# Patient Record
Sex: MALE | Race: WHITE | NOT HISPANIC OR LATINO | Employment: STUDENT | URBAN - METROPOLITAN AREA
[De-identification: names, ages, dates, MRNs, and addresses within clinical notes are randomized per-mention and may not be internally consistent; named-entity substitution may affect disease eponyms.]

---
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
## 2024-12-26 ENCOUNTER — OFFICE VISIT (OUTPATIENT)
Dept: FAMILY MEDICINE | Facility: CLINIC | Age: 19
End: 2024-12-26
Payer: COMMERCIAL

## 2024-12-26 ENCOUNTER — HOSPITAL ENCOUNTER (OUTPATIENT)
Dept: RADIOLOGY | Facility: HOSPITAL | Age: 19
Discharge: HOME | End: 2024-12-26
Attending: STUDENT IN AN ORGANIZED HEALTH CARE EDUCATION/TRAINING PROGRAM
Payer: COMMERCIAL

## 2024-12-26 VITALS
OXYGEN SATURATION: 98 % | WEIGHT: 265 LBS | HEIGHT: 69 IN | BODY MASS INDEX: 39.25 KG/M2 | SYSTOLIC BLOOD PRESSURE: 120 MMHG | TEMPERATURE: 99 F | HEART RATE: 75 BPM | DIASTOLIC BLOOD PRESSURE: 66 MMHG

## 2024-12-26 DIAGNOSIS — M25.511 ACUTE PAIN OF RIGHT SHOULDER: ICD-10-CM

## 2024-12-26 DIAGNOSIS — E66.812 CLASS 2 OBESITY WITH BODY MASS INDEX (BMI) OF 39.0 TO 39.9 IN ADULT, UNSPECIFIED OBESITY TYPE, UNSPECIFIED WHETHER SERIOUS COMORBIDITY PRESENT: ICD-10-CM

## 2024-12-26 DIAGNOSIS — Z72.0 CURRENT EVERY DAY NICOTINE VAPING: ICD-10-CM

## 2024-12-26 DIAGNOSIS — Z76.89 ESTABLISHING CARE WITH NEW DOCTOR, ENCOUNTER FOR: Primary | ICD-10-CM

## 2024-12-26 PROBLEM — L60.0 INGROWN NAIL: Status: RESOLVED | Noted: 2020-06-30 | Resolved: 2024-12-26

## 2024-12-26 PROBLEM — L03.031 CELLULITIS OF GREAT TOE OF RIGHT FOOT: Status: RESOLVED | Noted: 2020-06-30 | Resolved: 2024-12-26

## 2024-12-26 PROCEDURE — 73030 X-RAY EXAM OF SHOULDER: CPT | Mod: 26,RT,, | Performed by: RADIOLOGY

## 2024-12-26 PROCEDURE — 99999 PR PBB SHADOW E&M-NEW PATIENT-LVL IV: CPT | Mod: PBBFAC,,, | Performed by: STUDENT IN AN ORGANIZED HEALTH CARE EDUCATION/TRAINING PROGRAM

## 2024-12-26 PROCEDURE — 73030 X-RAY EXAM OF SHOULDER: CPT | Mod: TC,PO,RT

## 2024-12-26 RX ORDER — MELOXICAM 15 MG/1
15 TABLET ORAL DAILY
Qty: 30 TABLET | Refills: 0 | Status: SHIPPED | OUTPATIENT
Start: 2024-12-26 | End: 2025-01-25

## 2024-12-26 NOTE — ASSESSMENT & PLAN NOTE
Chronic problem.  Counseled on health risks related to tobacco use.  Discussed smoking cessation including Rx and non-Rx pharmacologic options and non pharmacologic options. A total of 4 minutes was spent on tobacco screening and counseling.

## 2024-12-26 NOTE — ASSESSMENT & PLAN NOTE
Chronic problem. Not well controlled based on recorded BMI. Discussed importance of lifestyle modifications including diet and exercise.

## 2024-12-26 NOTE — PROGRESS NOTES
Subjective      Agustín Shields is a 19 y.o. male        Chief Complaint   Patient presents with    Establish Care    right shoulder pain        HPI     Patient presents today to establish care.     Patient also here for concerns of right shoulder pain.  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  Patient fell from the 2nd level of the ship but was able to brace himself catching the handrail.  Following this incident, patient has reported pain as well as decreased range of motion.  He reports the pain is worsened with exertion and rated as a 7/10.  He has been taking Tylenol and ibuprofen.      Active Problem List with Overview Notes    Diagnosis Date Noted    Class 2 obesity with body mass index (BMI) of 39.0 to 39.9 in adult 12/26/2024     Patient with history of elevated BMI. Last recorded BMI listed below.  He reports periodic exercise without any specific diet plan.       Body mass index is 39.13 kg/m².        Current every day nicotine vaping 12/26/2024     Patient with history of tobacco abuse. He reports daily nicotine vape use without any thoughts of quitting.                  ALLERGIES  Patient has no known allergies.     MEDICATIONS  Outpatient Encounter Medications as of 12/26/2024   Medication Sig Dispense Refill    ASCORBATE CALCIUM (VITAMIN C ORAL) Take 1 tablet by mouth as needed.      ibuprofen (ADVIL,MOTRIN) 100 mg/5 mL suspension Take by mouth every 6 (six) hours as needed.      meloxicam (MOBIC) 15 MG tablet Take 1 tablet (15 mg total) by mouth once daily. 30 tablet 0     No facility-administered encounter medications on file as of 12/26/2024.        PAST MEDICAL HISTORY  Past Medical History:   Diagnosis Date    Kawasaki disease         PAST SURGIAL HISTORY  History reviewed. No pertinent surgical history.     FAMILY HISTORY  Family History   Problem Relation Name Age of Onset    Hypertension Mother      Diabetes Mother      Hypertension  Father      Restless legs syndrome Father      Sleep apnea Father      No Known Problems Maternal Grandmother      No Known Problems Maternal Grandfather      No Known Problems Paternal Grandfather      Amblyopia Neg Hx      Blindness Neg Hx      Cancer Neg Hx      Cataracts Neg Hx      Glaucoma Neg Hx      Macular degeneration Neg Hx      Retinal detachment Neg Hx      Strabismus Neg Hx      Stroke Neg Hx      Thyroid disease Neg Hx          PAST SOCIAL HISTORY  Social History     Socioeconomic History    Marital status: Single   Tobacco Use    Smoking status: Never    Smokeless tobacco: Current   Substance and Sexual Activity    Alcohol use: Yes     Comment: occasionally    Drug use: Never    Sexual activity: Yes     Partners: Female   Social History Narrative    8 year old male in the 5 grade        Health Maintenance   Topic Date Due    Hepatitis C Screening  Never done    HIV Screening  Never done    TETANUS VACCINE  04/14/2024    Influenza Vaccine (1) 09/01/2024    COVID-19 Vaccine (1 - 2024-25 season) Never done    RSV Vaccine (Age 60+ and Pregnant patients) (1 - 1-dose 75+ series) 03/22/2078    Lipid Panel  Completed    HPV Vaccines  Completed    Pneumococcal Vaccines (Age 0-49)  Aged Out           ROS        Objective   Vitals:    12/26/24 1306   BP: 120/66   Pulse: 75   Temp: 99.1 °F (37.3 °C)     Body mass index is 39.13 kg/m².       Physical Exam  Constitutional:       General: He is not in acute distress.     Appearance: He is obese. He is not ill-appearing.   HENT:      Head: Normocephalic and atraumatic.   Eyes:      Extraocular Movements: Extraocular movements intact.      Pupils: Pupils are equal, round, and reactive to light.   Cardiovascular:      Rate and Rhythm: Normal rate and regular rhythm.   Pulmonary:      Effort: Pulmonary effort is normal.      Breath sounds: Normal breath sounds.   Abdominal:      Palpations: Abdomen is soft.   Musculoskeletal:      Right shoulder: Tenderness present.  Decreased range of motion.      Cervical back: Normal range of motion.   Skin:     General: Skin is warm and dry.   Neurological:      General: No focal deficit present.      Mental Status: He is alert and oriented to person, place, and time. Mental status is at baseline.   Psychiatric:         Mood and Affect: Mood normal.         Thought Content: Thought content normal.          Establishing care with new doctor, encounter for    Class 2 obesity with body mass index (BMI) of 39.0 to 39.9 in adult, unspecified obesity type, unspecified whether serious comorbidity present  Assessment & Plan:  Chronic problem. Not well controlled based on recorded BMI. Discussed importance of lifestyle modifications including diet and exercise.         Current every day nicotine vaping  Assessment & Plan:  Chronic problem.  Counseled on health risks related to tobacco use.  Discussed smoking cessation including Rx and non-Rx pharmacologic options and non pharmacologic options. A total of 4 minutes was spent on tobacco screening and counseling.         Acute pain of right shoulder  Comments:  Acute problem.  Not well controlled per patient.  Will assess with x-ray.  Discussed pain control with Tylenol up to 3g/day. Start Mobic.  Orders:  -     meloxicam (MOBIC) 15 MG tablet; Take 1 tablet (15 mg total) by mouth once daily.  Dispense: 30 tablet; Refill: 0  -     X-ray Shoulder 2 or More Views Right; Future; Expected date: 12/26/2024              Maurisio Leonard             Portions of this note were created using voice recognition software. There may be voice recognition errors found in the text, and attempts were made to correct these errors prior to signature.

## 2025-01-16 ENCOUNTER — HOSPITAL ENCOUNTER (OUTPATIENT)
Dept: RADIOLOGY | Facility: HOSPITAL | Age: 20
Discharge: HOME | End: 2025-01-16
Attending: STUDENT IN AN ORGANIZED HEALTH CARE EDUCATION/TRAINING PROGRAM
Payer: COMMERCIAL

## 2025-01-16 ENCOUNTER — OFFICE VISIT (OUTPATIENT)
Dept: FAMILY MEDICINE | Facility: CLINIC | Age: 20
End: 2025-01-16
Payer: COMMERCIAL

## 2025-01-16 ENCOUNTER — LAB VISIT (OUTPATIENT)
Dept: LAB | Facility: HOSPITAL | Age: 20
End: 2025-01-16
Attending: STUDENT IN AN ORGANIZED HEALTH CARE EDUCATION/TRAINING PROGRAM
Payer: COMMERCIAL

## 2025-01-16 VITALS
SYSTOLIC BLOOD PRESSURE: 116 MMHG | OXYGEN SATURATION: 97 % | HEART RATE: 76 BPM | WEIGHT: 264 LBS | TEMPERATURE: 98 F | HEIGHT: 69 IN | DIASTOLIC BLOOD PRESSURE: 76 MMHG | BODY MASS INDEX: 39.1 KG/M2

## 2025-01-16 DIAGNOSIS — Z13.220 SCREENING FOR HYPERLIPIDEMIA: ICD-10-CM

## 2025-01-16 DIAGNOSIS — E66.812 CLASS 2 OBESITY DUE TO EXCESS CALORIES WITH BODY MASS INDEX (BMI) OF 39.0 TO 39.9 IN ADULT, UNSPECIFIED WHETHER SERIOUS COMORBIDITY PRESENT: ICD-10-CM

## 2025-01-16 DIAGNOSIS — M54.50 CHRONIC BILATERAL LOW BACK PAIN WITHOUT SCIATICA: ICD-10-CM

## 2025-01-16 DIAGNOSIS — E66.09 CLASS 2 OBESITY DUE TO EXCESS CALORIES WITH BODY MASS INDEX (BMI) OF 39.0 TO 39.9 IN ADULT, UNSPECIFIED WHETHER SERIOUS COMORBIDITY PRESENT: ICD-10-CM

## 2025-01-16 DIAGNOSIS — Z11.4 SCREENING FOR HIV (HUMAN IMMUNODEFICIENCY VIRUS): ICD-10-CM

## 2025-01-16 DIAGNOSIS — Z13.1 SCREENING FOR DIABETES MELLITUS: ICD-10-CM

## 2025-01-16 DIAGNOSIS — M25.511 ACUTE PAIN OF RIGHT SHOULDER: ICD-10-CM

## 2025-01-16 DIAGNOSIS — Z11.59 ENCOUNTER FOR HEPATITIS C SCREENING TEST FOR LOW RISK PATIENT: ICD-10-CM

## 2025-01-16 DIAGNOSIS — G89.29 CHRONIC BILATERAL LOW BACK PAIN WITHOUT SCIATICA: ICD-10-CM

## 2025-01-16 DIAGNOSIS — Z00.01 ANNUAL VISIT FOR GENERAL ADULT MEDICAL EXAMINATION WITH ABNORMAL FINDINGS: ICD-10-CM

## 2025-01-16 DIAGNOSIS — Z00.01 ANNUAL VISIT FOR GENERAL ADULT MEDICAL EXAMINATION WITH ABNORMAL FINDINGS: Primary | ICD-10-CM

## 2025-01-16 LAB
ALBUMIN SERPL BCP-MCNC: 4 G/DL (ref 3.5–5.2)
ALP SERPL-CCNC: 71 U/L (ref 40–150)
ALT SERPL W/O P-5'-P-CCNC: 24 U/L (ref 10–44)
ANION GAP SERPL CALC-SCNC: 11 MMOL/L (ref 8–16)
AST SERPL-CCNC: 18 U/L (ref 10–40)
BASOPHILS # BLD AUTO: 0.03 K/UL (ref 0–0.2)
BASOPHILS NFR BLD: 0.4 % (ref 0–1.9)
BILIRUB SERPL-MCNC: 0.3 MG/DL (ref 0.1–1)
BUN SERPL-MCNC: 7 MG/DL (ref 6–20)
CALCIUM SERPL-MCNC: 9.4 MG/DL (ref 8.7–10.5)
CHLORIDE SERPL-SCNC: 108 MMOL/L (ref 95–110)
CHOLEST SERPL-MCNC: 158 MG/DL (ref 120–199)
CHOLEST/HDLC SERPL: 4.2 {RATIO} (ref 2–5)
CO2 SERPL-SCNC: 22 MMOL/L (ref 23–29)
CREAT SERPL-MCNC: 0.8 MG/DL (ref 0.5–1.4)
DIFFERENTIAL METHOD BLD: NORMAL
EOSINOPHIL # BLD AUTO: 0.4 K/UL (ref 0–0.5)
EOSINOPHIL NFR BLD: 6.1 % (ref 0–8)
ERYTHROCYTE [DISTWIDTH] IN BLOOD BY AUTOMATED COUNT: 12.6 % (ref 11.5–14.5)
EST. GFR  (NO RACE VARIABLE): >60 ML/MIN/1.73 M^2
GLUCOSE SERPL-MCNC: 98 MG/DL (ref 70–110)
HCT VFR BLD AUTO: 46 % (ref 40–54)
HCV AB SERPL QL IA: NORMAL
HDLC SERPL-MCNC: 38 MG/DL (ref 40–75)
HDLC SERPL: 24.1 % (ref 20–50)
HGB BLD-MCNC: 15.7 G/DL (ref 14–18)
HIV 1+2 AB+HIV1 P24 AG SERPL QL IA: NORMAL
IMM GRANULOCYTES # BLD AUTO: 0.02 K/UL (ref 0–0.04)
IMM GRANULOCYTES NFR BLD AUTO: 0.3 % (ref 0–0.5)
LDLC SERPL CALC-MCNC: 106.8 MG/DL (ref 63–159)
LYMPHOCYTES # BLD AUTO: 2.1 K/UL (ref 1–4.8)
LYMPHOCYTES NFR BLD: 29.2 % (ref 18–48)
MCH RBC QN AUTO: 29.5 PG (ref 27–31)
MCHC RBC AUTO-ENTMCNC: 34.1 G/DL (ref 32–36)
MCV RBC AUTO: 87 FL (ref 82–98)
MONOCYTES # BLD AUTO: 0.6 K/UL (ref 0.3–1)
MONOCYTES NFR BLD: 7.6 % (ref 4–15)
NEUTROPHILS # BLD AUTO: 4.1 K/UL (ref 1.8–7.7)
NEUTROPHILS NFR BLD: 56.4 % (ref 38–73)
NONHDLC SERPL-MCNC: 120 MG/DL
NRBC BLD-RTO: 0 /100 WBC
PLATELET # BLD AUTO: 225 K/UL (ref 150–450)
PMV BLD AUTO: 11 FL (ref 9.2–12.9)
POTASSIUM SERPL-SCNC: 4 MMOL/L (ref 3.5–5.1)
PROT SERPL-MCNC: 7.3 G/DL (ref 6–8.4)
RBC # BLD AUTO: 5.32 M/UL (ref 4.6–6.2)
SODIUM SERPL-SCNC: 141 MMOL/L (ref 136–145)
TRIGL SERPL-MCNC: 66 MG/DL (ref 30–150)
WBC # BLD AUTO: 7.2 K/UL (ref 3.9–12.7)

## 2025-01-16 PROCEDURE — 72100 X-RAY EXAM L-S SPINE 2/3 VWS: CPT | Mod: 26,,, | Performed by: RADIOLOGY

## 2025-01-16 PROCEDURE — 1159F MED LIST DOCD IN RCRD: CPT | Mod: CPTII,S$GLB,, | Performed by: STUDENT IN AN ORGANIZED HEALTH CARE EDUCATION/TRAINING PROGRAM

## 2025-01-16 PROCEDURE — 80053 COMPREHEN METABOLIC PANEL: CPT | Performed by: STUDENT IN AN ORGANIZED HEALTH CARE EDUCATION/TRAINING PROGRAM

## 2025-01-16 PROCEDURE — 87389 HIV-1 AG W/HIV-1&-2 AB AG IA: CPT | Performed by: STUDENT IN AN ORGANIZED HEALTH CARE EDUCATION/TRAINING PROGRAM

## 2025-01-16 PROCEDURE — 3008F BODY MASS INDEX DOCD: CPT | Mod: CPTII,S$GLB,, | Performed by: STUDENT IN AN ORGANIZED HEALTH CARE EDUCATION/TRAINING PROGRAM

## 2025-01-16 PROCEDURE — 3074F SYST BP LT 130 MM HG: CPT | Mod: CPTII,S$GLB,, | Performed by: STUDENT IN AN ORGANIZED HEALTH CARE EDUCATION/TRAINING PROGRAM

## 2025-01-16 PROCEDURE — 86803 HEPATITIS C AB TEST: CPT | Performed by: STUDENT IN AN ORGANIZED HEALTH CARE EDUCATION/TRAINING PROGRAM

## 2025-01-16 PROCEDURE — 85025 COMPLETE CBC W/AUTO DIFF WBC: CPT | Performed by: STUDENT IN AN ORGANIZED HEALTH CARE EDUCATION/TRAINING PROGRAM

## 2025-01-16 PROCEDURE — 36415 COLL VENOUS BLD VENIPUNCTURE: CPT | Performed by: STUDENT IN AN ORGANIZED HEALTH CARE EDUCATION/TRAINING PROGRAM

## 2025-01-16 PROCEDURE — 3078F DIAST BP <80 MM HG: CPT | Mod: CPTII,S$GLB,, | Performed by: STUDENT IN AN ORGANIZED HEALTH CARE EDUCATION/TRAINING PROGRAM

## 2025-01-16 PROCEDURE — 1160F RVW MEDS BY RX/DR IN RCRD: CPT | Mod: CPTII,S$GLB,, | Performed by: STUDENT IN AN ORGANIZED HEALTH CARE EDUCATION/TRAINING PROGRAM

## 2025-01-16 PROCEDURE — 80061 LIPID PANEL: CPT | Performed by: STUDENT IN AN ORGANIZED HEALTH CARE EDUCATION/TRAINING PROGRAM

## 2025-01-16 PROCEDURE — 99395 PREV VISIT EST AGE 18-39: CPT | Mod: S$GLB,,, | Performed by: STUDENT IN AN ORGANIZED HEALTH CARE EDUCATION/TRAINING PROGRAM

## 2025-01-16 PROCEDURE — 72100 X-RAY EXAM L-S SPINE 2/3 VWS: CPT | Mod: TC

## 2025-01-16 PROCEDURE — 99999 PR PBB SHADOW E&M-EST. PATIENT-LVL III: CPT | Mod: PBBFAC,,, | Performed by: STUDENT IN AN ORGANIZED HEALTH CARE EDUCATION/TRAINING PROGRAM

## 2025-01-16 RX ORDER — MELOXICAM 15 MG/1
15 TABLET ORAL DAILY
Qty: 30 TABLET | Refills: 0 | Status: SHIPPED | OUTPATIENT
Start: 2025-01-16 | End: 2025-02-15

## 2025-01-16 NOTE — ASSESSMENT & PLAN NOTE
Chronic problem not well controlled  per patient .  Will assess with updated imaging including Lumbar xray.

## 2025-01-16 NOTE — ASSESSMENT & PLAN NOTE
Chronic problem not well controlled  based on  last BMI . Discussed importance of lifestyle modifications including diet and exercise. Exercise goal of 30 minutes per day for 5 days for total of 150 minutes per week. Handout given on Mediterranean diet.

## 2025-01-16 NOTE — PROGRESS NOTES
"Subjective   Patient is a 19 y.o. male here today for a physical/annual exam.  Complaints/concerns: He does have concerns about low back pain.      Problem Noted   Chronic Bilateral Low Back Pain Without Sciatica 1/16/2025    Patient with history of chronic bilateral low back pain.  He reports that this has been going on intermittently for several months and is worsened with certain positions. He denies any radiation of the pain.      Class 2 Obesity With Body Mass Index (Bmi) of 39.0 to 39.9 in Adult 12/26/2024    Patient with history of elevated BMI. Last recorded BMI listed below.  He reports periodic exercise without any specific diet plan.       Body mass index is 38.99 kg/m².       Current Every Day Nicotine Vaping 12/26/2024    Patient with history of tobacco abuse. He reports daily nicotine vape use without any thoughts of quitting.         Cellulitis of Great Toe of Right Foot (Resolved) 6/30/2020   Ingrown nail - Left Foot (Resolved) 6/30/2020               Screening Questions  1.  Do you smoke or use smokeless tobacco? yes  2.  In the past month have you been bothered by feeling "down", depressed or hopeless?  no  3.  In the past month, have you experienced a loss of interest or pleasure in doing things?  no  4.  In the past 3 months, on any single occasion have you had 5 or more drinks containing alcohol.  yes  5.  Regarding your use of alcohol, have you ever felt you should cut down on your drinking?  no  6.  Are you sexually active?  yes  7   Do you exercise?  daily     Counseling  The patient was counseled regarding diet and exercise, motor vehicle safety, sun exposure, and use of vitamins and supplements.       Screening Studies  Flu:  declined  Last Tdap (every 10 years):  4/14/2014  HPV:  4/14/2014, 8/19/2014, 12/23/2014  SARS (Covid-19): declined  Meningitis: 9/3/2019, 4/29/2020           PMH, Family history, Social history, Medicines, Allergies were reviewed as appropriate.     Pertinent items " "are noted in HPI.     Objective   Vitals:    01/16/25 1059   BP: 116/76   BP Location: Right arm   Patient Position: Sitting   Pulse: 76   Temp: 98.4 °F (36.9 °C)   TempSrc: Oral   SpO2: 97%   Weight: 119.7 kg (264 lb)   Height: 5' 9" (1.753 m)            Gen: WDWN, No acute distress  HEENT: Normocephalic, PERRL, Ears normal, Throat normal  Neck: Supple, normal thyroid, no adenopathy  Heart/CV: Reg rate and rhythm, No murmur  Lungs: CTA bilaterally, no wheeze, no rales, good inspiration/expiration  Abd: NTND, + normal bowel sounds, no rebound, no masses palpable  Ext: Normal pulses, no cyanosis or edema  Musc: Normal strength and tone and ROM, no gross abnormalities. Tenderness to palpation lumbar paraspinal.   Neuro: Normal sensation, CN 2-12 intact grossly, normal reflexes, normal gait  Skin: Good turgor and color, No suspicious lesions, No rashes  Psych: No SI or HI, normal mood       Annual visit for general adult medical examination with abnormal findings  Comments:  abnormal findings of back pain  Orders:  -     Comprehensive Metabolic Panel; Future; Expected date: 01/16/2025  -     CBC Auto Differential; Future; Expected date: 01/16/2025  -     Lipid Panel; Future; Expected date: 01/16/2025  -     Hepatitis C Antibody; Future; Expected date: 01/16/2025  -     HIV 1/2 Ag/Ab (4th Gen); Future; Expected date: 01/16/2025    Chronic bilateral low back pain without sciatica  Assessment & Plan:  Chronic problem not well controlled  per patient .  Will assess with updated imaging including Lumbar xray.      Orders:  -     X-Ray Lumbar Spine AP And Lateral; Future; Expected date: 01/16/2025    Class 2 obesity due to excess calories with body mass index (BMI) of 39.0 to 39.9 in adult, unspecified whether serious comorbidity present  Assessment & Plan:  Chronic problem not well controlled  based on  last BMI . Discussed importance of lifestyle modifications including diet and exercise. Exercise goal of 30 minutes per " day for 5 days for total of 150 minutes per week. Handout given on Mediterranean diet.           Acute pain of right shoulder  Comments:  Acute problem. Improving per patient. Continue Mobic.  Orders:  -     meloxicam (MOBIC) 15 MG tablet; Take 1 tablet (15 mg total) by mouth once daily.  Dispense: 30 tablet; Refill: 0    Screening for HIV (human immunodeficiency virus)  -     HIV 1/2 Ag/Ab (4th Gen); Future; Expected date: 01/16/2025    Encounter for hepatitis C screening test for low risk patient  -     Hepatitis C Antibody; Future; Expected date: 01/16/2025    Screening for hyperlipidemia  -     Lipid Panel; Future; Expected date: 01/16/2025    Screening for diabetes mellitus  -     Comprehensive Metabolic Panel; Future; Expected date: 01/16/2025         Labs ordered as appropriate for age and family history  Colonoscopy:  not needed for age  Immunizations: declined  Recommend annual eye and bi-annual dental exam  Patient counseled on diet and exercise  MVI daily  Depression screen was negative.  Return to clinic 1 year for physical or as indicated for any other medical conditions        Maurisio Leonard          Portions of this note were created using voice recognition software. There may be voice recognition errors found in the text, and attempts were made to correct these errors prior to signature.